# Patient Record
Sex: FEMALE | Race: BLACK OR AFRICAN AMERICAN | NOT HISPANIC OR LATINO | Employment: UNEMPLOYED | URBAN - METROPOLITAN AREA
[De-identification: names, ages, dates, MRNs, and addresses within clinical notes are randomized per-mention and may not be internally consistent; named-entity substitution may affect disease eponyms.]

---
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
## 2024-05-09 ENCOUNTER — OFFICE VISIT (OUTPATIENT)
Dept: FAMILY MEDICINE | Facility: CLINIC | Age: 19
End: 2024-05-09
Payer: COMMERCIAL

## 2024-05-09 VITALS
DIASTOLIC BLOOD PRESSURE: 64 MMHG | WEIGHT: 113 LBS | HEART RATE: 104 BPM | SYSTOLIC BLOOD PRESSURE: 110 MMHG | OXYGEN SATURATION: 99 % | HEIGHT: 67 IN | BODY MASS INDEX: 17.74 KG/M2

## 2024-05-09 DIAGNOSIS — Z00.00 ANNUAL PHYSICAL EXAM: Primary | ICD-10-CM

## 2024-05-09 DIAGNOSIS — F41.1 GAD (GENERALIZED ANXIETY DISORDER): ICD-10-CM

## 2024-05-09 DIAGNOSIS — T75.3XXD MOTION SICKNESS, SUBSEQUENT ENCOUNTER: ICD-10-CM

## 2024-05-09 PROCEDURE — 99395 PREV VISIT EST AGE 18-39: CPT | Mod: S$GLB,,, | Performed by: FAMILY MEDICINE

## 2024-05-09 RX ORDER — SCOLOPAMINE TRANSDERMAL SYSTEM 1 MG/1
1 PATCH, EXTENDED RELEASE TRANSDERMAL
Qty: 4 PATCH | Refills: 0 | Status: SHIPPED | OUTPATIENT
Start: 2024-05-09

## 2024-05-09 RX ORDER — SERTRALINE HYDROCHLORIDE 50 MG/1
50 TABLET, FILM COATED ORAL DAILY
Qty: 90 TABLET | Refills: 1 | Status: SHIPPED | OUTPATIENT
Start: 2024-05-09

## 2024-05-09 NOTE — PROGRESS NOTES
SUBJECTIVE:   HPI: Jeanna Curiel  is a 18 y.o. female who presents for annual physical .   Establish Care and discuss anxiety / referral if needed    Patient with history of generalized anxiety disorder presents for annual exam and to establish with a new physician.  She is not currently on any medications and has no other chronic issues.  She is up-to-date with immunizations.  She is up-to-date with vision and dental screenings.  She is a nondrinker nonsmoker.      She reports anxiety and PTSD.  Last medication was Lexapro and did not like the way this made her feel.  Had been on Zoloft as teenager and found it beneficial.  She had been going to a counselor with whom she was seeing no longer takes her insurance.  She would like to reestablish care.      (Not in a hospital admission)    Review of patient's allergies indicates:  No Known Allergies  Current Outpatient Medications on File Prior to Visit   Medication Sig Dispense Refill    [DISCONTINUED] sertraline (ZOLOFT) 50 MG tablet  (Patient not taking: Reported on 5/9/2024)  0     No current facility-administered medications on file prior to visit.     Past Medical History:   Diagnosis Date    Anxiety     Vision abnormalities      History reviewed. No pertinent surgical history.  Family History   Problem Relation Name Age of Onset    No Known Problems Mother hans     Hypertension Father reza     No Known Problems Sister rodo     No Known Problems Sister uvaldo     Hypertension Maternal Grandmother      Hyperlipidemia Maternal Grandmother       Social History     Tobacco Use    Smoking status: Never   Substance Use Topics    Alcohol use: No    Drug use: No      Health Maintenance Topics with due status: Not Due       Topic Last Completion Date    TETANUS VACCINE 08/21/2014    Influenza Vaccine Not Due     Immunization History   Administered Date(s) Administered    COVID-19, MRNA, LN-S, PF (Pfizer) (Purple Cap) 01/23/2022, 02/01/2022    DTaP 2003,  "2003, 2003, 09/03/2004, 09/25/2007    HIB 2003, 2003, 2003, 06/02/2004    HPV 9-Valent 12/22/2017, 06/12/2019    Hepatitis B, Pediatric/Adolescent 2003, 2003, 2003    IPV 2003, 2003, 2003, 09/25/2007    MMR 06/03/2004, 09/25/2007    Meningococcal Conjugate (MCV4P) 08/21/2014, 06/12/2019    Pneumococcal Conjugate - 7 Valent 2003, 2003, 2003    Tdap 08/21/2014    Varicella 09/03/2004, 09/25/2007       Review of Systems   Constitutional:  Negative for activity change, fatigue and unexpected weight change.   HENT:  Negative for hearing loss, postnasal drip, sinus pressure, sore throat and voice change.    Eyes:  Negative for photophobia and visual disturbance.   Respiratory:  Negative for cough, shortness of breath and wheezing.    Cardiovascular:  Negative for chest pain and palpitations.   Gastrointestinal:  Negative for constipation, diarrhea and nausea.   Genitourinary:  Negative for difficulty urinating, frequency, hematuria and urgency.   Musculoskeletal:  Negative for arthralgias and back pain.   Skin:  Negative for rash.   Neurological:  Negative for weakness, light-headedness and headaches.   Hematological:  Negative for adenopathy. Does not bruise/bleed easily.   Psychiatric/Behavioral:  Positive for sleep disturbance. The patient is nervous/anxious.       OBJECTIVE:          10/25/2019     3:21 PM 5/9/2024    11:28 AM   Vitals - 1 value per visit   SYSTOLIC 115 110   DIASTOLIC 70 64   Pulse 75 104   Temp 98.1 °F (36.7 °C)    Resp 16    SPO2  99 %   Weight (lb) 104.94 113   Weight (kg) 47.6 51.256   Height  5' 6.75" (1.695 m)   BMI (Calculated)  17.8   Pain Score Two       Physical Exam  Constitutional:       Appearance: Normal appearance. She is underweight.   HENT:      Head: Normocephalic and atraumatic.      Mouth/Throat:      Mouth: Mucous membranes are moist.   Eyes:      Conjunctiva/sclera: Conjunctivae normal. "   Pulmonary:      Effort: Pulmonary effort is normal.   Neurological:      General: No focal deficit present.      Mental Status: She is alert and oriented to person, place, and time.   Psychiatric:         Mood and Affect: Mood is anxious.         Behavior: Behavior is withdrawn.        Assessment:       1. Annual physical exam    2. CUCA (generalized anxiety disorder)    3. Motion sickness, subsequent encounter        Plan:       Annual physical exam    CUCA (generalized anxiety disorder)  -     sertraline (ZOLOFT) 50 MG tablet; Take 1 tablet (50 mg total) by mouth once daily.  Dispense: 90 tablet; Refill: 1  -     Ambulatory referral/consult to Psychology; Future; Expected date: 05/16/2024    Motion sickness, subsequent encounter  -     scopolamine (TRANSDERM-SCOP) 1.3-1.5 mg (1 mg over 3 days); Place 1 patch onto the skin every 72 hours.  Dispense: 4 patch; Refill: 0        Counseled on age and gender appropriate medical preventative services, including cancer screenings, immunizations, overall nutritional health, need for a consistent exercise regimen and an overall push towards maintaining a vigorous and active lifestyle.      Follow up in about 1 year (around 5/9/2025) for Annual Physical.